# Patient Record
Sex: MALE | URBAN - METROPOLITAN AREA
[De-identification: names, ages, dates, MRNs, and addresses within clinical notes are randomized per-mention and may not be internally consistent; named-entity substitution may affect disease eponyms.]

---

## 2024-09-24 ENCOUNTER — OFFICE VISIT (OUTPATIENT)
Dept: URGENT CARE | Age: 69
End: 2024-09-24
Payer: MEDICARE

## 2024-09-24 VITALS
HEIGHT: 72 IN | HEART RATE: 65 BPM | SYSTOLIC BLOOD PRESSURE: 141 MMHG | OXYGEN SATURATION: 97 % | WEIGHT: 165 LBS | BODY MASS INDEX: 22.35 KG/M2 | RESPIRATION RATE: 16 BRPM | TEMPERATURE: 98.3 F | DIASTOLIC BLOOD PRESSURE: 74 MMHG

## 2024-09-24 DIAGNOSIS — L25.9 ACUTE CONTACT DERMATITIS: Primary | ICD-10-CM

## 2024-09-24 PROCEDURE — 1126F AMNT PAIN NOTED NONE PRSNT: CPT

## 2024-09-24 PROCEDURE — 99203 OFFICE O/P NEW LOW 30 MIN: CPT

## 2024-09-24 PROCEDURE — 1036F TOBACCO NON-USER: CPT

## 2024-09-24 PROCEDURE — 3008F BODY MASS INDEX DOCD: CPT

## 2024-09-24 PROCEDURE — 1159F MED LIST DOCD IN RCRD: CPT

## 2024-09-24 RX ORDER — TRIAMCINOLONE ACETONIDE 1 MG/G
CREAM TOPICAL
Qty: 15 G | Refills: 0 | Status: SHIPPED | OUTPATIENT
Start: 2024-09-24 | End: 2024-11-23

## 2024-09-24 RX ORDER — PREDNISONE 20 MG/1
40 TABLET ORAL DAILY
Qty: 10 TABLET | Refills: 0 | Status: SHIPPED | OUTPATIENT
Start: 2024-09-24 | End: 2024-09-29

## 2024-09-24 ASSESSMENT — ENCOUNTER SYMPTOMS
RESPIRATORY NEGATIVE: 1
GASTROINTESTINAL NEGATIVE: 1
CARDIOVASCULAR NEGATIVE: 1
CONSTITUTIONAL NEGATIVE: 1

## 2024-09-24 ASSESSMENT — PAIN SCALES - GENERAL: PAINLEVEL: 0-NO PAIN

## 2024-09-24 NOTE — PROGRESS NOTES
Subjective   Patient ID: Alphonso Tovar is a 68 y.o. male. They present today with a chief complaint of Rash (Pt states he has a rash on his right knee that slightly itches and he believes it might be spreading.).    History of Present Illness  Rash: Patient complains of rash involving the right lower leg. Rash started 2 days ago. Appearance of rash at onset: Color of lesion(s): red, Texture of lesion(s): flat, Size of lesion(s): scattered. Rash has changed over time Initial distribution: right lower leg.  Discomfort associated with rash: is pruritic.  Associated symptoms: none. Denies: congestion, decrease in appetite, fever, nausea, and sore throat. Patient has not had previous evaluation of rash. Patient has not had previous treatment.  Patient has not had contacts with similar rash. Patient has identified precipitant; states he hikes in the woods frequently, as well has had some exposure to bird excrement. Patient has had new exposures (soaps, lotions, laundry detergents, foods, medications, plants, insects or animals.)       History provided by:  Patient      Past Medical History  Allergies as of 09/24/2024    (No Known Allergies)       (Not in a hospital admission)       No past medical history on file.    No past surgical history on file.     reports that he has never smoked. He has never used smokeless tobacco.    Review of Systems  Review of Systems   Constitutional: Negative.    HENT: Negative.     Respiratory: Negative.     Cardiovascular: Negative.    Gastrointestinal: Negative.    Skin:  Positive for rash.   All other systems reviewed and are negative.                                 Objective    Vitals:    09/24/24 1208   BP: 141/74   BP Location: Right arm   Patient Position: Sitting   BP Cuff Size: Large adult   Pulse: 65   Resp: 16   Temp: 36.8 °C (98.3 °F)   TempSrc: Oral   SpO2: 97%   Weight: 74.8 kg (165 lb)   Height: 1.829 m (6')     No LMP for male patient.    Physical Exam  Vitals and nursing  note reviewed.   Constitutional:       Appearance: Normal appearance.   Cardiovascular:      Rate and Rhythm: Normal rate and regular rhythm.      Pulses: Normal pulses.      Heart sounds: Normal heart sounds.   Pulmonary:      Effort: Pulmonary effort is normal.      Breath sounds: Normal breath sounds.   Skin:     General: Skin is warm and dry.      Capillary Refill: Capillary refill takes less than 2 seconds.      Findings: Rash present. Rash is macular.      Comments: Macules scattered on Right anterior shin and above the knee.   Neurological:      Mental Status: He is alert and oriented to person, place, and time.   Psychiatric:         Mood and Affect: Mood normal.         Behavior: Behavior normal.         Thought Content: Thought content normal.         Judgment: Judgment normal.         Procedures    Point of Care Test & Imaging Results from this visit  No results found for this visit on 09/24/24.   No results found.    Diagnostic study results (if any) were reviewed by PENG Medina.    Assessment/Plan   Allergies, medications, history, and pertinent labs/EKGs/Imaging reviewed by PENG Medina.     Medical Decision Making  Risks, benefits, and alternatives of the medications and treatment plan prescribed today were discussed, and patient expressed understanding. Plan follow up as discussed or as needed if any worsening symptoms or change in condition. Reinforced red flags including (but not limited to): severe or worsening pain; difficulty swallowing; stiff neck; shortness of breath; coughing or vomiting blood; chest pain; and new or increased fever are indications to go to the Emergency Department.     The patient voices understanding of all medications. No barriers to adherence. Patient is taking all medications as prescribed and tolerating well. For any new medications, the patient was instructed of directions for and consequences of not taking medication and they were  informed about the potential side effects and drug interactions. The after-visit summary was given to the patient and care instructions were reviewed with the patient. All questions were answered and the patient verbalized understanding of the plan of care for today.      Orders and Diagnoses  Diagnoses and all orders for this visit:  Acute contact dermatitis  -     predniSONE (Deltasone) 20 mg tablet; Take 2 tablets (40 mg) by mouth once daily for 5 days.  -     triamcinolone (Kenalog) 0.1 % cream; Apply to affected area 1-2 times daily as needed.      Medical Admin Record      Patient disposition: Home    Electronically signed by PENG Medina  1:49 PM